# Patient Record
Sex: MALE | Race: AMERICAN INDIAN OR ALASKA NATIVE
[De-identification: names, ages, dates, MRNs, and addresses within clinical notes are randomized per-mention and may not be internally consistent; named-entity substitution may affect disease eponyms.]

---

## 2022-06-16 ENCOUNTER — HOSPITAL ENCOUNTER (OUTPATIENT)
Dept: HOSPITAL 5 - CATHLABREC | Age: 52
Discharge: ANOTHER HOSPITAL | End: 2022-06-16
Attending: INTERNAL MEDICINE
Payer: COMMERCIAL

## 2022-06-16 VITALS — SYSTOLIC BLOOD PRESSURE: 131 MMHG | DIASTOLIC BLOOD PRESSURE: 86 MMHG

## 2022-06-16 DIAGNOSIS — E78.5: ICD-10-CM

## 2022-06-16 DIAGNOSIS — I10: ICD-10-CM

## 2022-06-16 DIAGNOSIS — I25.110: Primary | ICD-10-CM

## 2022-06-16 DIAGNOSIS — I25.5: ICD-10-CM

## 2022-06-16 DIAGNOSIS — Z79.899: ICD-10-CM

## 2022-06-16 DIAGNOSIS — Z79.82: ICD-10-CM

## 2022-06-16 DIAGNOSIS — Z98.890: ICD-10-CM

## 2022-06-16 LAB
BAND NEUTROPHILS # (MANUAL): 0 K/MM3
BUN SERPL-MCNC: 5 MG/DL (ref 9–20)
BUN/CREAT SERPL: 5 %
CALCIUM SERPL-MCNC: 9.7 MG/DL (ref 8.4–10.2)
HCT VFR BLD CALC: 46.4 % (ref 35.5–45.6)
HEMOLYSIS INDEX: 5
HGB BLD-MCNC: 15.4 GM/DL (ref 11.8–15.2)
INR PPP: 0.95 (ref 0.87–1.13)
MCHC RBC AUTO-ENTMCNC: 33 % (ref 32–34)
MCV RBC AUTO: 86 FL (ref 84–94)
MYELOCYTES # (MANUAL): 0 K/MM3
PLATELET # BLD: 199 K/MM3 (ref 140–440)
PROMYELOCYTES # (MANUAL): 0 K/MM3
RBC # BLD AUTO: 5.37 M/MM3 (ref 3.65–5.03)
TOTAL CELLS COUNTED BLD: 100

## 2022-06-16 PROCEDURE — 85007 BL SMEAR W/DIFF WBC COUNT: CPT

## 2022-06-16 PROCEDURE — 93458 L HRT ARTERY/VENTRICLE ANGIO: CPT

## 2022-06-16 PROCEDURE — 99156 MOD SED OTH PHYS/QHP 5/>YRS: CPT

## 2022-06-16 PROCEDURE — 85730 THROMBOPLASTIN TIME PARTIAL: CPT

## 2022-06-16 PROCEDURE — 85610 PROTHROMBIN TIME: CPT

## 2022-06-16 PROCEDURE — 93005 ELECTROCARDIOGRAM TRACING: CPT

## 2022-06-16 PROCEDURE — 85025 COMPLETE CBC W/AUTO DIFF WBC: CPT

## 2022-06-16 PROCEDURE — 99157 MOD SED OTHER PHYS/QHP EA: CPT

## 2022-06-16 PROCEDURE — C1887 CATHETER, GUIDING: HCPCS

## 2022-06-16 PROCEDURE — 80048 BASIC METABOLIC PNL TOTAL CA: CPT

## 2022-06-16 PROCEDURE — C1894 INTRO/SHEATH, NON-LASER: HCPCS

## 2022-06-16 PROCEDURE — 36415 COLL VENOUS BLD VENIPUNCTURE: CPT

## 2022-06-16 RX ADMIN — SODIUM CHLORIDE SCH MLS: 0.9 INJECTION, SOLUTION INTRAVENOUS at 13:00

## 2022-06-16 RX ADMIN — FENTANYL CITRATE ONE MCG: 50 INJECTION, SOLUTION INTRAMUSCULAR; INTRAVENOUS at 13:32

## 2022-06-16 RX ADMIN — FENTANYL CITRATE ONE MCG: 50 INJECTION, SOLUTION INTRAMUSCULAR; INTRAVENOUS at 13:08

## 2022-06-16 RX ADMIN — SODIUM CHLORIDE SCH MLS/HR: 0.9 INJECTION, SOLUTION INTRAVENOUS at 12:03

## 2022-06-16 NOTE — DISCHARGE SUMMARY
Short Stay Discharge Plan


Activity: other (Bedrest)


Diet: low fat, low cholesterol, low salt


Wound: keep clean and dry


Additional Instructions: 


Outpatient cardiac catheterization completed via the right radial approach.  

Patient has severe three-vessel disease, recommend inpatient transfer for CT 

surgery assessment.  We will start the patient on intravenous nitroglycerin and 

heparin.


Follow up with: 


DELANEY COON MD [Primary Care Provider] - 7 Days

## 2022-06-16 NOTE — CARDIAC CATHERIZATION REPORT
DATE OF SERVICE: 06/16/2022



CARDIAC CATHETERIZATION REPORT



REASON FOR PROCEDURE:  Unstable angina.



PROCEDURES:

1.  Left heart catheterization.

2.  Selective left and right coronary angiography.

3.  Left ventricular angiography.

4.  Sedation time start 1309, end 1334.



DESCRIPTION OF PROCEDURE:  The patient was prepped and draped in a sterile 

fashion after informed consent.  The right radial cath site was prepped and 

draped after negative Parish's test.  The right radial artery was entered using 

Seldinger technique followed by placement of a 6-Ind[place] hydrophilic sheath.  

Routine radial cocktail was administered via the sheath.



Selective left and right coronary angiography was performed, we used a 3.0 left 

Tory catheter for left coronary angiography.  A Fantasma catheter was used for 

right coronary angiography.  A pigtail catheter was used for left ventricular 

angiography.  The catheters were then removed, sheath removed and hemostasis 

achieved using a TR band.  The patient was returned to the postprocedure unit in

stable condition.  There were no complications.



FINDINGS:

HEMODYNAMICS:  Left ventricular end diastolic pressure was 20, following 

coronary angiography.  Ascending aortic pressure was 179/101.  There was no 

significant pressure gradient on pullback across the aortic valve.



CORONARY ANGIOGRAPHY:  Left main coronary artery was free of significant 

disease.



The first diagonal branch of the LAD was a large vessel that contained diffuse 

mild atherosclerosis.



Following that, there was a 90% bifurcation stenosis of the mid LAD involving a 

medium sized mid diagonal branch.  The mid diagonal branch itself had an 

additional, long 95% stenosis of its proximal segment extending from its ostium.



Following the severe bifurcation stenosis of the mid LAD, there was another, 80%

stenosis of the mid to distal segment.  This was followed by mild 

atherosclerosis of the LAD leading to diffuse severe disease of the small apical

segment of the LAD.



The mid obtuse marginal branch of the LAD also contained diffuse disease.  There

was a long, greater than 90% stenosis of the mid segment of this vessel.



The right coronary artery was dominant.  This vessel contained diffuse mild 

atherosclerosis associated with extension of the proximal and mid AV groove 

segments.  The right posterior descending branch of the distal right coronary 

artery contained severe diffuse disease in its distal segment.  The posterior 

left ventricular branch, which was a terminal branch of the right coronary 

artery, was completely occluded in its distal segment.  There was some faint 

collateralization of the terminal segments of this branch from the left coronary

system.



The left ventricle was mildly to moderately dilated.  There was moderate to 

moderately severe left ventricular systolic dysfunction with left ventricular 

ejection fraction 35-40%.



CONCLUSION:

1.  Severe 3-vessel coronary artery disease.

2.  Ischemic cardiomyopathy, moderate to moderately severe left ventricular 

systolic dysfunction, ejection fraction 35-40%.



RECOMMENDATIONS:  The patient will be referred for CT surgery assessment.  We 

will place the patient on anti-ischemic therapy including intravenous 

nitroglycerin, oral antiplatelets and intravenous anticoagulants on transfer.







DD: 06/16/2022 01:48 PM

DT: 06/16/2022 02:03 PM

TID: [id number] RECEIPT: [id number]

CA/SOPHIA

## 2022-06-17 NOTE — ELECTROCARDIOGRAPH REPORT
[facility]

                                       

Test Date:    2022               Test Time:    11:38:37

Pat Name:     WILDA BAÑUELOS              Department:   

Patient ID:   [id number]          Room:          

Gender:       M                        Technician:   FIONA

:          1970               Requested By: TITI COPELAND

Order Number: [id number]              Reading MD:   Epi Nguyen

                                 Measurements

Intervals                              Axis          

Rate:         74                       P:            33

AZ:           145                      QRS:          -23

QRSD:         93                       T:            -8

QT:           388                                    

QTc:          433                                    

                           Interpretive Statements

Sinus rhythm

No previous ECG available for comparison

Electronically Signed On 2022 11:26:03 EDT by Epi Nguyen